# Patient Record
Sex: FEMALE
[De-identification: names, ages, dates, MRNs, and addresses within clinical notes are randomized per-mention and may not be internally consistent; named-entity substitution may affect disease eponyms.]

---

## 2019-03-12 ENCOUNTER — HOSPITAL ENCOUNTER (OUTPATIENT)
Dept: HOSPITAL 9 - MADULT | Age: 44
Discharge: HOME | End: 2019-03-12
Payer: COMMERCIAL

## 2019-03-12 DIAGNOSIS — J11.89: ICD-10-CM

## 2019-03-12 DIAGNOSIS — K76.0: ICD-10-CM

## 2019-03-12 DIAGNOSIS — R11.0: ICD-10-CM

## 2019-03-12 DIAGNOSIS — R10.13: ICD-10-CM

## 2019-03-12 DIAGNOSIS — K21.9: Primary | ICD-10-CM

## 2019-03-12 PROCEDURE — 76705 ECHO EXAM OF ABDOMEN: CPT

## 2019-03-12 NOTE — ULT
ULTRASOUND ABDOMEN LIMITED:

(RIGHT UPPER QUADRANT)

 

HISTORY: 

Right upper quadrant abdominal pain and post prandial nausea in 43-year-old female.

 

FINDINGS: 

The gallbladder has normal wall thickness and has no evidence of gallstones or sludge.  The hepatic e
chogenicity is diffusely increased, consistent with fatty liver.  The right kidney has normal echogen
icity and has no hydronephrosis.  The pancreas is obscured by bowel gas.  There is no biliary dilatio
n.  The common duct caliber is 3 mm.

 

IMPRESSION: 

 

1)  Hepatosteatosis.

 

2)  No other pathology identified.

 

3)  Pancreas not visualized.

 

 

da 

 

POS: MAAME